# Patient Record
Sex: FEMALE | Race: WHITE
[De-identification: names, ages, dates, MRNs, and addresses within clinical notes are randomized per-mention and may not be internally consistent; named-entity substitution may affect disease eponyms.]

---

## 2019-10-07 ENCOUNTER — HOSPITAL ENCOUNTER (OUTPATIENT)
Dept: HOSPITAL 47 - RADECHMAIN | Age: 24
Discharge: HOME | End: 2019-10-07
Attending: FAMILY MEDICINE
Payer: COMMERCIAL

## 2019-10-07 DIAGNOSIS — R42: ICD-10-CM

## 2019-10-07 DIAGNOSIS — R07.89: ICD-10-CM

## 2019-10-07 DIAGNOSIS — R00.0: Primary | ICD-10-CM

## 2019-10-07 DIAGNOSIS — R00.1: ICD-10-CM

## 2019-10-07 DIAGNOSIS — F41.8: ICD-10-CM

## 2019-10-07 PROCEDURE — 93270 REMOTE 30 DAY ECG REV/REPORT: CPT

## 2019-10-18 NOTE — P.PN
Progress Note - Text


Progress Note Date: 10/18/19





This is a report on the seven-day event monitor starting from October 7.





Baseline rhythm showed sinus with normal WI interval and QRS duration.  Patient 

had episodes of sinus tachycardia with rates up to 180 episodes of sinus 

bradycardia with minimal rates of 57.  Occasional APCs.  Patient complained of 

being dizzy and racing of the heart, shortness of breath, chest pressure on 

several occasions.  These symptoms did not correlate with any significant 

cardiac events.





Final impression: #1.  Baseline rhythm is sinus.  #2.  Episodes of sinus 

tachycardia and sinus bradycardia.  #3.  Several symptoms of dizziness, 

lightheadedness and racing chest tightness, not correlating with any cardiac e

vents.

## 2019-10-21 NOTE — EM
This is a report on the seven-day event monitor starting from October 7.



Baseline rhythm showed sinus with normal OH interval and QRS duration.  Patient 
had episodes of sinus tachycardia with rates up to 180 episodes of sinus 
bradycardia with minimal rates of 57.  Occasional APCs.  Patient complained of 
being dizzy and racing of the heart, shortness of breath, chest pressure on 
several occasions.  These symptoms did not correlate with any significant 
cardiac events.



Final impression: #1.  Baseline rhythm is sinus.  #2.  Episodes of sinus 
tachycardia and sinus bradycardia.  #3.  Several symptoms of dizziness, 
lightheadedness and racing chest tightness, not correlating with any cardiac 
events.

MTDD

## 2019-11-12 ENCOUNTER — HOSPITAL ENCOUNTER (OUTPATIENT)
Dept: HOSPITAL 47 - CATHEP | Age: 24
Discharge: HOME | End: 2019-11-12
Attending: INTERNAL MEDICINE
Payer: COMMERCIAL

## 2019-11-12 VITALS — RESPIRATION RATE: 16 BRPM | TEMPERATURE: 98.8 F

## 2019-11-12 VITALS — SYSTOLIC BLOOD PRESSURE: 106 MMHG | HEART RATE: 65 BPM | DIASTOLIC BLOOD PRESSURE: 72 MMHG

## 2019-11-12 VITALS — BODY MASS INDEX: 21.2 KG/M2

## 2019-11-12 DIAGNOSIS — R55: Primary | ICD-10-CM

## 2019-11-12 PROCEDURE — 93660 TILT TABLE EVALUATION: CPT

## 2019-11-12 NOTE — P.PCN
Preoperative Diagnosis: 


Diagnosis recurrent presyncope





Twelve-lead ECG shows 


sinus rhythm normal IN narrow QRS epsilon waves no ST segment abnormalities 

other than early repolarization abnormality of less than 1 mm no delta waves 

normal QT interval





Tilt table test per protocol


Baseline blood pressure 117/64 mmHg, Baseline heart rate 64 beats a minute


Patient was tilted upright at an angle of 70 per protocol.  Within 12 minutes 

her heart rate increased 222 beats a minute 4 by sudden drop in blood pressure. 

Lowest blood pressure recorded was 60/30 mmHg.  She became pre-syncopal, briefly


This reproduced her clinical symptoms


Upon assuming supine position, heart rate and blood pressure normalized and 

symptoms improved





Impression


Normal twelve-lead ECG


Neurocardiogenic syncope

## 2020-03-17 ENCOUNTER — HOSPITAL ENCOUNTER (OUTPATIENT)
Dept: HOSPITAL 47 - RADECHMAIN | Age: 25
Discharge: HOME | End: 2020-03-17
Attending: FAMILY MEDICINE
Payer: COMMERCIAL

## 2020-03-17 DIAGNOSIS — I95.1: Primary | ICD-10-CM

## 2020-03-17 DIAGNOSIS — I47.1: ICD-10-CM

## 2020-03-17 PROCEDURE — 93270 REMOTE 30 DAY ECG REV/REPORT: CPT

## 2020-04-21 NOTE — EM
EVENT MONITOR



INDICATION:

Palpitations and orthostatic hypotension.



I reviewed the provided event monitor strips.  Patient was primarily in sinus rhythm

with episodes of sinus tachycardia.  The maximum heart rate was around 160 beats per

minute. There were frequent PACs and a 4-beat run of atrial tachycardia noted.  There

were no pauses of more than 2 seconds.



CONCLUSIONS:

This event monitor shows sinus rhythm with sinus tachycardia and paroxysmal atrial

tachycardia.





MMODL / IJN: [id number] / Job#: [id number]

## 2020-07-30 ENCOUNTER — HOSPITAL ENCOUNTER (OUTPATIENT)
Dept: HOSPITAL 47 - LABWHC1 | Age: 25
Discharge: HOME | End: 2020-07-30
Attending: SOCIAL WORKER
Payer: COMMERCIAL

## 2020-07-30 DIAGNOSIS — R76.8: Primary | ICD-10-CM

## 2020-07-30 LAB
ALBUMIN SERPL-MCNC: 4.7 G/DL (ref 3.8–4.9)
ALBUMIN/GLOB SERPL: 1.88 G/DL (ref 1.6–3.17)
ALP SERPL-CCNC: 58 U/L (ref 41–126)
ALT SERPL-CCNC: 28 U/L (ref 8–44)
ANION GAP SERPL CALC-SCNC: 6.6 MMOL/L (ref 4–12)
AST SERPL-CCNC: 23 U/L (ref 13–35)
BASOPHILS # BLD AUTO: 0 K/UL (ref 0–0.2)
BASOPHILS NFR BLD AUTO: 0 %
BUN SERPL-SCNC: 13 MG/DL (ref 9–27)
BUN/CREAT SERPL: 14.44 RATIO (ref 12–20)
CALCIUM SPEC-MCNC: 10 MG/DL (ref 8.7–10.3)
CHLORIDE SERPL-SCNC: 107 MMOL/L (ref 96–109)
CO2 SERPL-SCNC: 24.4 MMOL/L (ref 21.6–31.8)
DSDNA AB SER QL: (no result)
DSDNA AB TITR SER: 5 IU/ML
ENA SCL70 AB SER IA-ACNC: <0.2 AI
ENA SM AB SER IA-ACNC: NEGATIVE
EOSINOPHIL # BLD AUTO: 0 K/UL (ref 0–0.7)
EOSINOPHIL NFR BLD AUTO: 1 %
ERYTHROCYTE [DISTWIDTH] IN BLOOD BY AUTOMATED COUNT: 4.7 M/UL (ref 3.8–5.4)
ERYTHROCYTE [DISTWIDTH] IN BLOOD: 12 % (ref 11.5–15.5)
GLOBULIN SER CALC-MCNC: 2.5 G/DL (ref 1.6–3.3)
GLUCOSE SERPL-MCNC: 82 MG/DL (ref 70–110)
HBV SURFACE AB SERPL IA-ACNC: 3.5 MIU/ML
HCT VFR BLD AUTO: 43 % (ref 34–46)
HGB BLD-MCNC: 13.8 GM/DL (ref 11.4–16)
IRON SERPL-MCNC: 112 UG/DL (ref 50–170)
LYMPHOCYTES # SPEC AUTO: 1.9 K/UL (ref 1–4.8)
LYMPHOCYTES NFR SPEC AUTO: 44 %
MCH RBC QN AUTO: 29.4 PG (ref 25–35)
MCHC RBC AUTO-ENTMCNC: 32.2 G/DL (ref 31–37)
MCV RBC AUTO: 91.4 FL (ref 80–100)
MONOCYTES # BLD AUTO: 0.2 K/UL (ref 0–1)
MONOCYTES NFR BLD AUTO: 4 %
NEUTROPHILS # BLD AUTO: 2.1 K/UL (ref 1.3–7.7)
NEUTROPHILS NFR BLD AUTO: 48 %
PLATELET # BLD AUTO: 237 K/UL (ref 150–450)
POTASSIUM SERPL-SCNC: 4.1 MMOL/L (ref 3.5–5.5)
PROT SERPL-MCNC: 7.2 G/DL (ref 6.2–8.2)
SODIUM SERPL-SCNC: 138 MMOL/L (ref 135–145)
TIBC SERPL-MCNC: 367 UG/DL (ref 228–460)
VIT B12 SERPL-MCNC: 310 PG/ML (ref 200–944)
WBC # BLD AUTO: 4.4 K/UL (ref 3.8–10.6)

## 2020-07-30 PROCEDURE — 86706 HEP B SURFACE ANTIBODY: CPT

## 2020-07-30 PROCEDURE — 36415 COLL VENOUS BLD VENIPUNCTURE: CPT

## 2020-07-30 PROCEDURE — 86235 NUCLEAR ANTIGEN ANTIBODY: CPT

## 2020-07-30 PROCEDURE — 83550 IRON BINDING TEST: CPT

## 2020-07-30 PROCEDURE — 87340 HEPATITIS B SURFACE AG IA: CPT

## 2020-07-30 PROCEDURE — 86225 DNA ANTIBODY NATIVE: CPT

## 2020-07-30 PROCEDURE — 82306 VITAMIN D 25 HYDROXY: CPT

## 2020-07-30 PROCEDURE — 82746 ASSAY OF FOLIC ACID SERUM: CPT

## 2020-07-30 PROCEDURE — 86704 HEP B CORE ANTIBODY TOTAL: CPT

## 2020-07-30 PROCEDURE — 82607 VITAMIN B-12: CPT

## 2020-07-30 PROCEDURE — 83540 ASSAY OF IRON: CPT

## 2020-07-30 PROCEDURE — 86160 COMPLEMENT ANTIGEN: CPT

## 2020-07-30 PROCEDURE — 80053 COMPREHEN METABOLIC PANEL: CPT

## 2020-07-30 PROCEDURE — 86803 HEPATITIS C AB TEST: CPT

## 2020-07-30 PROCEDURE — 85025 COMPLETE CBC W/AUTO DIFF WBC: CPT

## 2020-10-29 ENCOUNTER — HOSPITAL ENCOUNTER (EMERGENCY)
Dept: HOSPITAL 47 - EC | Age: 25
Discharge: HOME | End: 2020-10-29
Payer: COMMERCIAL

## 2020-10-29 VITALS
SYSTOLIC BLOOD PRESSURE: 94 MMHG | RESPIRATION RATE: 16 BRPM | HEART RATE: 84 BPM | TEMPERATURE: 97.7 F | DIASTOLIC BLOOD PRESSURE: 63 MMHG

## 2020-10-29 DIAGNOSIS — Z88.5: ICD-10-CM
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
## 2020-10-29 DIAGNOSIS — Z88.8: ICD-10-CM

## 2020-10-29 DIAGNOSIS — S61.411A: Primary | ICD-10-CM

## 2020-10-29 PROCEDURE — 12002 RPR S/N/AX/GEN/TRNK2.6-7.5CM: CPT

## 2020-10-29 PROCEDURE — 99282 EMERGENCY DEPT VISIT SF MDM: CPT

## 2020-10-29 NOTE — ED
Skin/Abscess/FB HPI





- General


Chief complaint: Skin/Abscess/Foreign Body


Stated complaint: R Hand Lac


Time Seen by Provider: 10/29/20 20:32


Source: patient


Mode of arrival: ambulatory


Limitations: no limitations





- History of Present Illness


Initial comments: 





Patient is a 24-year-old female presenting to the emergency department with a 

chief complaint of laceration to the hand.  Patient states this occurred about 

one hour prior to her level when she was carving a pumpkin and accidentally 

slipped her skin with the knife.  Patient reports the laceration is between the 

first and second digits of the right hand.  Patient reports that she is able to 

still fully move her thumb but does have a small region of numbness on the pad 

of the thumb.  She reports her tetanus status is up-to-date.  Reports there was 

some bleeding initially which has mostly resolved.  States the pain is 

exacerbated with any pressure to the lacerated region.





- Related Data


                                Home Medications











 Medication  Instructions  Recorded  Confirmed


 


Birthcontol Pill 1 tab PO DAILY 02/07/15 02/07/15


 


Escitalopram [Lexapro] 20 mg PO DAILY 11/08/19 11/12/19











                                    Allergies











Allergy/AdvReac Type Severity Reaction Status Date / Time


 


citalopram Allergy  Rash/Hives Verified 10/29/20 20:28


 


codeine AdvReac  Nausea & Verified 10/29/20 20:28





   Vomiting  














Review of Systems


ROS Statement: 


Those systems with pertinent positive or pertinent negative responses have been 

documented in the HPI.





ROS Other: All systems not noted in ROS Statement are negative.





Past Medical History


Additional Past Medical History / Comment(s): see Dr Majano's H&P,multiple 

fractures due to competative cheer


History of Any Multi-Drug Resistant Organisms: None Reported


Past Surgical History: Adenoidectomy, Tonsillectomy


Past Anesthesia/Blood Transfusion Reactions: No Reported Reaction


Additional Past Anesthesia/Blood Transfusion Reaction / Comment(s): no hx blood 

transfusion


Past Psychological History: Anxiety, PTSD


Smoking Status: Never smoker


Past Alcohol Use History: None Reported


Past Drug Use History: Marijuana





- Past Family History


  ** Mother


Family Medical History: No Reported History





General Exam


Limitations: no limitations


General appearance: alert, in no apparent distress


Head exam: Present: atraumatic, normocephalic, normal inspection


Eye exam: Present: normal appearance, PERRL, EOMI.  Absent: scleral icterus, 

conjunctival injection, nystagmus


Pupils: Present: normal accommodation


ENT exam: Present: normal exam, normal oropharynx, mucous membranes moist, TM's 

normal bilaterally, normal external ear exam


Neck exam: Present: normal inspection, full ROM.  Absent: tenderness


Respiratory exam: Present: normal lung sounds bilaterally.  Absent: respiratory 

distress, wheezes, rales


Cardiovascular Exam: Present: regular rate, normal rhythm, normal heart sounds


Extremities exam: Present: full ROM (Full range of motion in the right thumb and

 second digit.), tenderness (Tenderness laceration site.), normal capillary 

refill (Normal capillary refill in all of the fingers of the right hand.), other

 (+2 ulnar and radial pulses bilaterally.).  Absent: normal inspection 

(Laceration in the webbing between first and second digit of her right hand.  

Laceration is about 5 cm in length.  Not able to visualize any muscles in the 

hand.), pedal edema, joint swelling, calf tenderness


Back exam: Present: normal inspection, full ROM.  Absent: tenderness, CVA 

tenderness (R), CVA tenderness (L)


Neurological exam: Present: alert, oriented X3


Psychiatric exam: Present: normal affect, normal mood


Skin exam: Present: warm, dry, intact, normal color





Course


                                   Vital Signs











  10/29/20





  20:25


 


Temperature 97.7 F


 


Pulse Rate 84


 


Respiratory 16





Rate 


 


Blood Pressure 94/63


 


O2 Sat by Pulse 100





Oximetry 














Procedures





- Laceration


  ** Laceration #1


Consent Obtained: verbal consent


Indication: laceration


Site: hand


Size (cm): 5


Description: linear, clean


Depth: simple, single layer


Sedation/Analgesia: none


Anesthetic Used: lidocaine 1%


Anesthesia Technique: local infiltration


Amount (mls): 5


Pre-repair: irrigated extensively, deep structures intact


Type of Sutures: nylon


Size of Sutures: 4-0


Number of Sutures: 7


Technique: simple, interrupted


Patient Tolerated Procedure: well, no complications





Medical Decision Making





- Medical Decision Making





patient is a 24-year-old female presenting to emergency Department with a chief 

complaint of laceration.  On physical examination, patient has a 5 cm laceration

 in the webbing between first and second digit of right hand.  She did have some

 decreased sensation in a small region measuring approximately 1 cm on the 

finger pad of the thumb.  She otherwise has normal capillary refill of the thumb

 and second digit.  She also has full range of motion in those fingers as well. 

 Laceration site was thoroughly irrigated and repaired with 7 sutures.  Patient 

tolerated procedure well.  Return parameters were discussed and she was advised 

to return in 10 days for suture removal.  I also advised to follow-up with 

.  Tetanus was up-to-date.  Case discussed with physician.





Disposition


Clinical Impression: 


 Laceration





Disposition: HOME SELF-CARE


Condition: Stable


Instructions (If sedation given, give patient instructions):  Care For Your 

Stitches (DC), Laceration (DC)


Additional Instructions: 


Follow-up with 


Please return to the emergency room in 8-10 days to have sutures removed.  

Please watch for any signs of infection which may include increased pain, 

swelling, redness, fever or chills.  Please return to emergency room for any 

signs of infection do occur.  Please use clean soap and water over the area to 

prevent scabbing over your stitches.  Please leave wound covered for the first 

24-48 hours and then leave wound open to air.  Please return to the emergency 

room for any other concerns.


Is patient prescribed a controlled substance at d/c from ED?: No


Referrals: 


ADAM Mendez III, MD [Primary Care Provider] - 1-2 days


Serg Pacheco DO [Doctor of Osteopathic Medicine] - 1-2 days


Time of Disposition: 21:09

## 2020-12-10 ENCOUNTER — HOSPITAL ENCOUNTER (EMERGENCY)
Dept: HOSPITAL 47 - EC | Age: 25
Discharge: HOME | End: 2020-12-10
Payer: COMMERCIAL

## 2020-12-10 VITALS — DIASTOLIC BLOOD PRESSURE: 86 MMHG | HEART RATE: 67 BPM | SYSTOLIC BLOOD PRESSURE: 134 MMHG

## 2020-12-10 VITALS — RESPIRATION RATE: 18 BRPM | TEMPERATURE: 98.9 F

## 2020-12-10 DIAGNOSIS — Z88.8: ICD-10-CM

## 2020-12-10 DIAGNOSIS — Z88.5: ICD-10-CM

## 2020-12-10 DIAGNOSIS — F41.9: ICD-10-CM

## 2020-12-10 DIAGNOSIS — N39.0: Primary | ICD-10-CM

## 2020-12-10 DIAGNOSIS — F43.10: ICD-10-CM

## 2020-12-10 DIAGNOSIS — Z79.899: ICD-10-CM

## 2020-12-10 LAB
ALBUMIN SERPL-MCNC: 3.7 G/DL (ref 3.5–5)
ALP SERPL-CCNC: 54 U/L (ref 38–126)
ALT SERPL-CCNC: 15 U/L (ref 4–34)
ANION GAP SERPL CALC-SCNC: 4 MMOL/L
AST SERPL-CCNC: 18 U/L (ref 14–36)
BASOPHILS # BLD AUTO: 0 K/UL (ref 0–0.2)
BASOPHILS NFR BLD AUTO: 1 %
BUN SERPL-SCNC: 15 MG/DL (ref 7–17)
CALCIUM SPEC-MCNC: 9 MG/DL (ref 8.4–10.2)
CHLORIDE SERPL-SCNC: 111 MMOL/L (ref 98–107)
CO2 SERPL-SCNC: 24 MMOL/L (ref 22–30)
EOSINOPHIL # BLD AUTO: 0.1 K/UL (ref 0–0.7)
EOSINOPHIL NFR BLD AUTO: 1 %
ERYTHROCYTE [DISTWIDTH] IN BLOOD BY AUTOMATED COUNT: 3.88 M/UL (ref 3.8–5.4)
ERYTHROCYTE [DISTWIDTH] IN BLOOD: 11.9 % (ref 11.5–15.5)
GLUCOSE SERPL-MCNC: 97 MG/DL (ref 74–99)
HCT VFR BLD AUTO: 35.4 % (ref 34–46)
HGB BLD-MCNC: 12.1 GM/DL (ref 11.4–16)
LYMPHOCYTES # SPEC AUTO: 2.2 K/UL (ref 1–4.8)
LYMPHOCYTES NFR SPEC AUTO: 34 %
MCH RBC QN AUTO: 31.2 PG (ref 25–35)
MCHC RBC AUTO-ENTMCNC: 34.2 G/DL (ref 31–37)
MCV RBC AUTO: 91.1 FL (ref 80–100)
MONOCYTES # BLD AUTO: 0.5 K/UL (ref 0–1)
MONOCYTES NFR BLD AUTO: 7 %
NEUTROPHILS # BLD AUTO: 3.5 K/UL (ref 1.3–7.7)
NEUTROPHILS NFR BLD AUTO: 54 %
PH UR: 6 [PH] (ref 5–8)
PLATELET # BLD AUTO: 189 K/UL (ref 150–450)
POTASSIUM SERPL-SCNC: 3.7 MMOL/L (ref 3.5–5.1)
PROT SERPL-MCNC: 6.5 G/DL (ref 6.3–8.2)
PROT UR QL: (no result)
RBC UR QL: 35 /HPF (ref 0–5)
SODIUM SERPL-SCNC: 139 MMOL/L (ref 137–145)
SP GR UR: 1.02 (ref 1–1.03)
SQUAMOUS UR QL AUTO: 2 /HPF (ref 0–4)
UROBILINOGEN UR QL STRIP: <2 MG/DL (ref ?–2)
WBC # BLD AUTO: 6.4 K/UL (ref 3.8–10.6)
WBC # UR AUTO: 129 /HPF (ref 0–5)

## 2020-12-10 PROCEDURE — 87086 URINE CULTURE/COLONY COUNT: CPT

## 2020-12-10 PROCEDURE — 81025 URINE PREGNANCY TEST: CPT

## 2020-12-10 PROCEDURE — 96374 THER/PROPH/DIAG INJ IV PUSH: CPT

## 2020-12-10 PROCEDURE — 36415 COLL VENOUS BLD VENIPUNCTURE: CPT

## 2020-12-10 PROCEDURE — 96375 TX/PRO/DX INJ NEW DRUG ADDON: CPT

## 2020-12-10 PROCEDURE — 81001 URINALYSIS AUTO W/SCOPE: CPT

## 2020-12-10 PROCEDURE — 74176 CT ABD & PELVIS W/O CONTRAST: CPT

## 2020-12-10 PROCEDURE — 83605 ASSAY OF LACTIC ACID: CPT

## 2020-12-10 PROCEDURE — 80053 COMPREHEN METABOLIC PANEL: CPT

## 2020-12-10 PROCEDURE — 99284 EMERGENCY DEPT VISIT MOD MDM: CPT

## 2020-12-10 PROCEDURE — 85025 COMPLETE CBC W/AUTO DIFF WBC: CPT

## 2020-12-10 NOTE — ED
Female Urogenital HPI





- General


Chief complaint: Urogenital


Stated complaint: Back Pain


Time Seen by Provider: 12/10/20 06:03


Source: patient


Mode of arrival: ambulatory





- History of Present Illness


Initial comments: 


24yo female presenting for low back pain. Patient states that she has had back 

pain and some lower midline tenderness for the past 2-3 days. On first day she 

states she had some right sided pain, that went away--two hours ago the patient 

states that experiences sharp left sided pain. Pt was concerned of kidney stone 

or bladder infection and presented to the ER. Pt denies pregnancy. Denies 

immunocompromise, no chronic steroids/immune modulators. Patient denies fevers, 

chills or general malaise. Pt denies additional complaints. Upon arrival she is 

pleasant and nontoxic in appearance.








- Related Data


                                Home Medications











 Medication  Instructions  Recorded  Confirmed


 


Escitalopram [Lexapro] 20 mg PO DAILY 11/08/19 12/10/20


 


ALPRAZolam [Xanax] 0.25 mg PO DAILY PRN 12/10/20 12/10/20


 


Butalb/APAP/Caff -40Mg 1 tab PO Q4H PRN 12/10/20 12/10/20





[Fioricet -40]   


 


Kariva 1 tab PO DAILY 12/10/20 12/10/20


 


Ondansetron Odt [Zofran Odt] 4 mg PO Q4H PRN 12/10/20 12/10/20








                                  Previous Rx's











 Medication  Instructions  Recorded


 


Cephalexin [Keflex] 500 mg PO Q6HR 5 Days #20 cap 12/10/20











                                    Allergies











Allergy/AdvReac Type Severity Reaction Status Date / Time


 


citalopram Allergy  Rash/Hives Verified 12/10/20 07:01


 


codeine AdvReac  Nausea & Verified 12/10/20 07:01





   Vomiting  














Review of Systems


ROS Statement: 


Those systems with pertinent positive or pertinent negative responses have been 

documented in the HPI.





ROS Other: All systems not noted in ROS Statement are negative.





Past Medical History


Additional Past Medical History / Comment(s): see Dr Majano's H&P,multiple 

fractures due to competative cheer


History of Any Multi-Drug Resistant Organisms: None Reported


Past Surgical History: Adenoidectomy, Tonsillectomy


Past Anesthesia/Blood Transfusion Reactions: No Reported Reaction


Additional Past Anesthesia/Blood Transfusion Reaction / Comment(s): no hx blood 

transfusion


Past Psychological History: Anxiety, PTSD


Smoking Status: Never smoker


Past Alcohol Use History: None Reported


Past Drug Use History: Marijuana





- Past Family History


  ** Mother


Family Medical History: No Reported History





General Exam





- General Exam Comments


Initial Comments: 


General:  The patient is awake and alert, in no distress


Eye:  +3 mm pupils are equal, round and reactive to light, extra-ocular 

movements are intact.  No nystagmus.  There is normal conjunctiva bilaterally.  

No signs of icterus.  


Ears, nose, mouth and throat:  There are moist mucous membranes and no oral 

lesions. 


Gastrointestinal:  Soft, non-distended, non-tender abdomen without masses or 

organomegaly noted. There is no rebound or guarding present.  


Musculoskeletal:  Normal ROM, no tenderness.  Strength 5/5. Sensation intact. 

Radial pulses equal bilaterally 2+.  


Neurological:  A&O x 3. CN II-XII intact grossly, There are no obvious motor or 

sensory deficits. Coordination appears grossly intact. Speech is normal.


Skin:  Skin is warm and dry and no rashes or lesions are noted. 


Psychiatric:  Cooperative, appropriate mood & affect, normal judgment.  








Course


                                   Vital Signs











  12/10/20 12/10/20





  05:34 07:54


 


Temperature 98.9 F 


 


Pulse Rate 72 67


 


Respiratory 18 18





Rate  


 


Blood Pressure 140/91 134/86


 


O2 Sat by Pulse 99 100





Oximetry  














Medical Decision Making





- Medical Decision Making


labs stable. afebrile. suprapubic tenderness. low back pain b/l. changes sides. 

CT (-) stone. urine consistent with infection. culture pending. pt given 

rocephin. initiated on oral antibiotics. is to f/u with pcp adn return for 

worsenign symptoms. dr. olivo agreeable to care plan and discharge.








- Lab Data


Result diagrams: 


                                 12/10/20 07:30





                                 12/10/20 07:30


                                   Lab Results











  12/10/20 12/10/20 12/10/20 Range/Units





  05:39 05:41 07:30 


 


WBC    6.4  (3.8-10.6)  k/uL


 


RBC    3.88  (3.80-5.40)  m/uL


 


Hgb    12.1  (11.4-16.0)  gm/dL


 


Hct    35.4  (34.0-46.0)  %


 


MCV    91.1  (80.0-100.0)  fL


 


MCH    31.2  (25.0-35.0)  pg


 


MCHC    34.2  (31.0-37.0)  g/dL


 


RDW    11.9  (11.5-15.5)  %


 


Plt Count    189  (150-450)  k/uL


 


MPV    8.0  


 


Neutrophils %    54  %


 


Lymphocytes %    34  %


 


Monocytes %    7  %


 


Eosinophils %    1  %


 


Basophils %    1  %


 


Neutrophils #    3.5  (1.3-7.7)  k/uL


 


Lymphocytes #    2.2  (1.0-4.8)  k/uL


 


Monocytes #    0.5  (0-1.0)  k/uL


 


Eosinophils #    0.1  (0-0.7)  k/uL


 


Basophils #    0.0  (0-0.2)  k/uL


 


Sodium     (137-145)  mmol/L


 


Potassium     (3.5-5.1)  mmol/L


 


Chloride     ()  mmol/L


 


Carbon Dioxide     (22-30)  mmol/L


 


Anion Gap     mmol/L


 


BUN     (7-17)  mg/dL


 


Creatinine     (0.52-1.04)  mg/dL


 


Est GFR (CKD-EPI)AfAm     (>60 ml/min/1.73 sqM)  


 


Est GFR (CKD-EPI)NonAf     (>60 ml/min/1.73 sqM)  


 


Glucose     (74-99)  mg/dL


 


Plasma Lactic Acid Asif     (0.7-2.0)  mmol/L


 


Calcium     (8.4-10.2)  mg/dL


 


Total Bilirubin     (0.2-1.3)  mg/dL


 


AST     (14-36)  U/L


 


ALT     (4-34)  U/L


 


Alkaline Phosphatase     ()  U/L


 


Total Protein     (6.3-8.2)  g/dL


 


Albumin     (3.5-5.0)  g/dL


 


Urine Color  Yellow    


 


Urine Appearance  Cloudy H    (Clear)  


 


Urine pH  6.0    (5.0-8.0)  


 


Ur Specific Gravity  1.020    (1.001-1.035)  


 


Urine Protein  1+ H    (Negative)  


 


Urine Glucose (UA)  Negative    (Negative)  


 


Urine Ketones  Negative    (Negative)  


 


Urine Blood  Small H    (Negative)  


 


Urine Nitrite  Negative    (Negative)  


 


Urine Bilirubin  Negative    (Negative)  


 


Urine Urobilinogen  <2.0    (<2.0)  mg/dL


 


Ur Leukocyte Esterase  Large H    (Negative)  


 


Urine RBC  35 H    (0-5)  /hpf


 


Urine WBC  129 H    (0-5)  /hpf


 


Ur Squamous Epith Cells  2    (0-4)  /hpf


 


Urine Bacteria  Rare H    (None)  /hpf


 


Urine Mucus  Many H    (None)  /hpf


 


Urine Yeast (Budding)  Occasional H    (None)  /hpf


 


Urine HCG, Qual   Not Detected   (Not Detectd)  














  12/10/20 12/10/20 Range/Units





  07:30 07:30 


 


WBC    (3.8-10.6)  k/uL


 


RBC    (3.80-5.40)  m/uL


 


Hgb    (11.4-16.0)  gm/dL


 


Hct    (34.0-46.0)  %


 


MCV    (80.0-100.0)  fL


 


MCH    (25.0-35.0)  pg


 


MCHC    (31.0-37.0)  g/dL


 


RDW    (11.5-15.5)  %


 


Plt Count    (150-450)  k/uL


 


MPV    


 


Neutrophils %    %


 


Lymphocytes %    %


 


Monocytes %    %


 


Eosinophils %    %


 


Basophils %    %


 


Neutrophils #    (1.3-7.7)  k/uL


 


Lymphocytes #    (1.0-4.8)  k/uL


 


Monocytes #    (0-1.0)  k/uL


 


Eosinophils #    (0-0.7)  k/uL


 


Basophils #    (0-0.2)  k/uL


 


Sodium  139   (137-145)  mmol/L


 


Potassium  3.7   (3.5-5.1)  mmol/L


 


Chloride  111 H   ()  mmol/L


 


Carbon Dioxide  24   (22-30)  mmol/L


 


Anion Gap  4   mmol/L


 


BUN  15   (7-17)  mg/dL


 


Creatinine  0.81   (0.52-1.04)  mg/dL


 


Est GFR (CKD-EPI)AfAm  >90   (>60 ml/min/1.73 sqM)  


 


Est GFR (CKD-EPI)NonAf  >90   (>60 ml/min/1.73 sqM)  


 


Glucose  97   (74-99)  mg/dL


 


Plasma Lactic Acid Asif   0.6 L  (0.7-2.0)  mmol/L


 


Calcium  9.0   (8.4-10.2)  mg/dL


 


Total Bilirubin  0.3   (0.2-1.3)  mg/dL


 


AST  18   (14-36)  U/L


 


ALT  15   (4-34)  U/L


 


Alkaline Phosphatase  54   ()  U/L


 


Total Protein  6.5   (6.3-8.2)  g/dL


 


Albumin  3.7   (3.5-5.0)  g/dL


 


Urine Color    


 


Urine Appearance    (Clear)  


 


Urine pH    (5.0-8.0)  


 


Ur Specific Gravity    (1.001-1.035)  


 


Urine Protein    (Negative)  


 


Urine Glucose (UA)    (Negative)  


 


Urine Ketones    (Negative)  


 


Urine Blood    (Negative)  


 


Urine Nitrite    (Negative)  


 


Urine Bilirubin    (Negative)  


 


Urine Urobilinogen    (<2.0)  mg/dL


 


Ur Leukocyte Esterase    (Negative)  


 


Urine RBC    (0-5)  /hpf


 


Urine WBC    (0-5)  /hpf


 


Ur Squamous Epith Cells    (0-4)  /hpf


 


Urine Bacteria    (None)  /hpf


 


Urine Mucus    (None)  /hpf


 


Urine Yeast (Budding)    (None)  /hpf


 


Urine HCG, Qual    (Not Detectd)  














Disposition


Clinical Impression: 


 UTI (urinary tract infection)





Disposition: HOME SELF-CARE


Condition: Good


Instructions (If sedation given, give patient instructions):  Urinary Tract 

Infection in Women (ED)


Additional Instructions: 


Please use medication as discussed.  Please follow-up with family doctor in the 

next 2 days. Please return to emergency room if the symptoms increase or worsen 

or for any other concerns.


Prescriptions: 


Cephalexin [Keflex] 500 mg PO Q6HR 5 Days #20 cap


Is patient prescribed a controlled substance at d/c from ED?: No


Referrals: 


ADAM Mendez III, MD [Primary Care Provider] - 1-2 days


Time of Disposition: 07:52

## 2020-12-10 NOTE — CT
EXAMINATION TYPE: CT abdomen pelvis wo con

 

DATE OF EXAM: 12/10/2020

 

HISTORY: Right sided flank pain

 

CT DLP: 371.8 mGycm.  Automated Exposure Control for Dose Reduction was Utilized.

 

TECHNIQUE:  CT scan of the abdomen and pelvis is performed without oral or IV contrast.

 

COMPARISON: NONE

 

FINDINGS:  Within the limitations of a non-contrast study, the following observations are made.

 

LUNG BASES: No significant abnormality is appreciated.

 

LIVER/GB: Gallbladder is contracted in appearance.

 

PANCREAS: No significant abnormality is seen.

 

SPLEEN: No significant abnormality is seen.

 

ADRENALS: No significant abnormality is seen.

 

KIDNEYS: No renal stones or hydronephrosis is present bilaterally.

 

BOWEL: Suboptimal evaluation without enteric contrast and patient having little intra-abdominal fat. 
No suspicious small or large bowel dilatation is appreciated. Slightly low-lying cecum with normal ap
pearing appendix medially from it noted.

 

GENITAL ORGANS: Anteverted uterus. Occasional scattered pelvic phleboliths.

 

LYMPH NODES: No greater than 1cm abdominal or pelvic lymph nodes are appreciated.

 

OSSEOUS STRUCTURES: Insertional-type vertebra at lumbosacral junction. Slight dextroconvex scoliotic 
curvature on coronal images.

 

OTHER: No significant additional abnormality is seen.

 

IMPRESSION: No renal stones or hydronephrosis is seen bilaterally. No acute findings identified on no
ncontrast CT

## 2021-06-12 ENCOUNTER — HOSPITAL ENCOUNTER (EMERGENCY)
Dept: HOSPITAL 47 - EC | Age: 26
Discharge: HOME | End: 2021-06-12
Payer: COMMERCIAL

## 2021-06-12 VITALS
DIASTOLIC BLOOD PRESSURE: 88 MMHG | TEMPERATURE: 97.9 F | SYSTOLIC BLOOD PRESSURE: 126 MMHG | HEART RATE: 71 BPM | RESPIRATION RATE: 16 BRPM

## 2021-06-12 DIAGNOSIS — Z02.83: Primary | ICD-10-CM
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
## 2021-06-12 PROCEDURE — 99283 EMERGENCY DEPT VISIT LOW MDM: CPT
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
## 2023-01-20 ENCOUNTER — HOSPITAL ENCOUNTER (EMERGENCY)
Dept: HOSPITAL 47 - EC | Age: 28
Discharge: HOME | End: 2023-01-20
Payer: COMMERCIAL

## 2023-01-20 VITALS — DIASTOLIC BLOOD PRESSURE: 84 MMHG | SYSTOLIC BLOOD PRESSURE: 122 MMHG | HEART RATE: 99 BPM | RESPIRATION RATE: 18 BRPM

## 2023-01-20 VITALS — TEMPERATURE: 98.2 F

## 2023-01-20 DIAGNOSIS — F41.9: ICD-10-CM

## 2023-01-20 DIAGNOSIS — Z88.1: ICD-10-CM

## 2023-01-20 DIAGNOSIS — F12.90: ICD-10-CM

## 2023-01-20 DIAGNOSIS — R11.2: Primary | ICD-10-CM

## 2023-01-20 DIAGNOSIS — Z88.2: ICD-10-CM

## 2023-01-20 DIAGNOSIS — Z88.5: ICD-10-CM

## 2023-01-20 LAB
ALBUMIN SERPL-MCNC: 5.4 G/DL (ref 3.5–5)
ALP SERPL-CCNC: 67 U/L (ref 38–126)
ALT SERPL-CCNC: 29 U/L (ref 4–34)
AMYLASE SERPL-CCNC: 80 U/L (ref 30–110)
ANION GAP SERPL CALC-SCNC: 14 MMOL/L
AST SERPL-CCNC: 28 U/L (ref 14–36)
BASOPHILS # BLD AUTO: 0 K/UL (ref 0–0.2)
BASOPHILS NFR BLD AUTO: 0 %
BUN SERPL-SCNC: 18 MG/DL (ref 7–17)
CALCIUM SPEC-MCNC: 10.3 MG/DL (ref 8.4–10.2)
CHLORIDE SERPL-SCNC: 108 MMOL/L (ref 98–107)
CO2 SERPL-SCNC: 22 MMOL/L (ref 22–30)
EOSINOPHIL # BLD AUTO: 0.1 K/UL (ref 0–0.7)
EOSINOPHIL NFR BLD AUTO: 1 %
ERYTHROCYTE [DISTWIDTH] IN BLOOD BY AUTOMATED COUNT: 5.05 M/UL (ref 3.8–5.4)
ERYTHROCYTE [DISTWIDTH] IN BLOOD: 12.4 % (ref 11.5–15.5)
GLUCOSE SERPL-MCNC: 148 MG/DL (ref 74–99)
HCG SERPL-MCNC: <2.4 MIU/ML
HCT VFR BLD AUTO: 45.6 % (ref 34–46)
HGB BLD-MCNC: 15 GM/DL (ref 11.4–16)
LIPASE SERPL-CCNC: 82 U/L (ref 23–300)
LYMPHOCYTES # SPEC AUTO: 1.7 K/UL (ref 1–4.8)
LYMPHOCYTES NFR SPEC AUTO: 12 %
MCH RBC QN AUTO: 29.7 PG (ref 25–35)
MCHC RBC AUTO-ENTMCNC: 33 G/DL (ref 31–37)
MCV RBC AUTO: 90.1 FL (ref 80–100)
MONOCYTES # BLD AUTO: 0.5 K/UL (ref 0–1)
MONOCYTES NFR BLD AUTO: 4 %
NEUTROPHILS # BLD AUTO: 11.4 K/UL (ref 1.3–7.7)
NEUTROPHILS NFR BLD AUTO: 82 %
PLATELET # BLD AUTO: 281 K/UL (ref 150–450)
POTASSIUM SERPL-SCNC: 4.1 MMOL/L (ref 3.5–5.1)
PROT SERPL-MCNC: 8.9 G/DL (ref 6.3–8.2)
SODIUM SERPL-SCNC: 144 MMOL/L (ref 137–145)
WBC # BLD AUTO: 13.9 K/UL (ref 3.8–10.6)

## 2023-01-20 PROCEDURE — 99284 EMERGENCY DEPT VISIT MOD MDM: CPT

## 2023-01-20 PROCEDURE — 96361 HYDRATE IV INFUSION ADD-ON: CPT

## 2023-01-20 PROCEDURE — 82150 ASSAY OF AMYLASE: CPT

## 2023-01-20 PROCEDURE — 36415 COLL VENOUS BLD VENIPUNCTURE: CPT

## 2023-01-20 PROCEDURE — 85025 COMPLETE CBC W/AUTO DIFF WBC: CPT

## 2023-01-20 PROCEDURE — 96375 TX/PRO/DX INJ NEW DRUG ADDON: CPT

## 2023-01-20 PROCEDURE — 96374 THER/PROPH/DIAG INJ IV PUSH: CPT

## 2023-01-20 PROCEDURE — 83690 ASSAY OF LIPASE: CPT

## 2023-01-20 PROCEDURE — 80053 COMPREHEN METABOLIC PANEL: CPT

## 2023-01-20 PROCEDURE — 84702 CHORIONIC GONADOTROPIN TEST: CPT

## 2023-01-20 NOTE — ED
General Adult HPI





- General


Chief complaint: Nausea/Vomiting/Diarrhea


Stated complaint: Nausea, vomiting


Time Seen by Provider: 01/20/23 09:23


Source: patient, family, RN notes reviewed


Mode of arrival: ambulatory


Limitations: no limitations





- History of Present Illness


Initial comments: 





Patient is a pleasant 27-year-old female presenting to the emergency department 

with concerns for nausea vomiting.  Onset of symptoms was just 2 hours ago.  

Patient has vomited several times and still feels nauseated.  Patient is 

vomiting now.  Patient denies possible pregnancy.  Patient has had some minimal 

loose stools.  No abdominal pain.  No fever.  No history of chronic similar 

symptoms previously.  Patient did try oral Zofran without improvement.





- Related Data


                                Home Medications











 Medication  Instructions  Recorded  Confirmed


 


Escitalopram [Lexapro] 20 mg PO DAILY 11/08/19 12/10/20


 


ALPRAZolam [Xanax] 0.25 mg PO DAILY PRN 12/10/20 12/10/20


 


Butalb/APAP/Caff -40Mg 1 tab PO Q4H PRN 12/10/20 12/10/20





[Fioricet -40]   


 


Kariva 1 tab PO DAILY 12/10/20 12/10/20


 


Ondansetron Odt [Zofran Odt] 4 mg PO Q4H PRN 12/10/20 12/10/20








                                  Previous Rx's











 Medication  Instructions  Recorded


 


cephALEXin [Keflex] 500 mg PO Q6HR 5 Days #20 cap 12/10/20


 


Metoclopramide HCl [Reglan] 10 mg PO Q6HR PRN #15 tablet 01/20/23











                                    Allergies











Allergy/AdvReac Type Severity Reaction Status Date / Time


 


citalopram Allergy  Rash/Hives Verified 06/12/21 00:10


 


sulfamethoxazole Allergy  Nausea & Verified 01/20/23 09:21





[From Bactrim]   Vomiting  


 


trimethoprim [From Bactrim] Allergy  Nausea & Verified 01/20/23 09:21





   Vomiting  


 


codeine AdvReac  Nausea & Verified 06/12/21 00:10





   Vomiting  














Review of Systems


ROS Statement: 


Those systems with pertinent positive or pertinent negative responses have been 

documented in the HPI.





ROS Other: All systems not noted in ROS Statement are negative.


Constitutional: Denies: fever


Eyes: Denies: eye pain


ENT: Denies: ear pain


Respiratory: Denies: cough


Cardiovascular: Denies: chest pain


Endocrine: Denies: fatigue


Gastrointestinal: Reports: as per HPI, nausea, vomiting, diarrhea.  Denies: 

abdominal pain, constipation


Genitourinary: Denies: dysuria


Musculoskeletal: Denies: back pain


Skin: Denies: lesions


Neurological: Denies: weakness





Past Medical History


Additional Past Medical History / Comment(s): see Dr Majano's H&P,multiple fra

ctures due to competative cheer


History of Any Multi-Drug Resistant Organisms: None Reported


Past Surgical History: Adenoidectomy, Tonsillectomy


Past Anesthesia/Blood Transfusion Reactions: No Reported Reaction


Additional Past Anesthesia/Blood Transfusion Reaction / Comment(s): no hx blood 

transfusion


Past Psychological History: Anxiety, PTSD


Smoking Status: Never smoker


Past Alcohol Use History: Occasional


Past Drug Use History: Marijuana





- Past Family History


  ** Mother


Family Medical History: No Reported History





General Exam


Limitations: no limitations


General appearance: alert, in no apparent distress


Head exam: Present: normocephalic


Eye exam: Present: normal appearance


Neck exam: Present: normal inspection


Respiratory exam: Present: normal lung sounds bilaterally


Cardiovascular Exam: Present: regular rate, normal rhythm


GI/Abdominal exam: Present: soft.  Absent: distended, tenderness, guarding, 

rebound, rigid, pulsatile mass


Extremities exam: Present: normal inspection


Neurological exam: Present: alert


Psychiatric exam: Present: normal affect, normal mood


Skin exam: Present: normal color





Course


                                   Vital Signs











  01/20/23 01/20/23 01/20/23





  09:18 10:33 11:09


 


Temperature 98.2 F  


 


Pulse Rate 94 98 98


 


Respiratory 22 18 20





Rate   


 


Blood Pressure 115/74  


 


O2 Sat by Pulse 96 99 99





Oximetry   














Medical Decision Making





- Medical Decision Making





Was pt. sent in by a medical professional or institution (, PA, NP, urgent 

care, hospital, or nursing home...) When possible be specific


@  -No


Did you speak to anyone other than the patient for history (EMS, parent, family,

police, friend...)? What history was obtained from this source 


@  -Family is present and helps provide history regarding onset of symptoms


Did you review nursing and triage notes (agree or disagree)?  Why? 


@  -I reviewed and agree with nursing and triage notes


Were old charts reviewed (outside hosp., previous admission, EMS record, old 

EKG, old radiological studies, urgent care reports/EKG's, nursing home records)?

Report findings 


@  -No old charts were reviewed


Differential Diagnosis (chest pain, altered mental status, abdominal pain women,

abdominal pain men, vaginal bleeding, weakness, fever, dyspnea, syncope, 

headache, dizziness, GI bleed, back pain, seizure, CVA, palpatations, mental 

health)? 


@  -Differential Abdominal Pain Women:


Appendicitis, Cholecystitis, diverticulosis, ischemic bowel, pancreatitis, 

hepatitis, UTI, gastroenteritis, AAA, incarcerated hernia, bowel obstruction, 

constipation, inflammatory bowel, hepatitis, peptic ulcer disease, splenic 

infarction, perforated viscus, vulvitis, ovarian torsion, PID, kidney stone, 

placenta abruption, this is not meant to be an all-inclusive list


EKG interpreted by me (3pts min.).


@  -


X-rays interpreted by me (1pt min.).


@  -None done


CT interpreted by me (1pt min.).


@  -None done


U/S interpreted by me (1pt. min.).


@  -None done


What testing was considered but not performed or refused? (CT, X-rays, U/S, 

labs)? Why?


@  -None


What meds were considered but not given or refused? Why?


@  -Considered further nausea medication however patient feels better


Did you discuss the management of the patient with other professionals (pr

ofessionals i.e. , PA, NP, lab, RT, psych nurse, , , 

teacher, , )? Give summary


@  -No


Was smoking cessation discussed for >3mins.?


@  -No


Was critical care preformed (if so, how long)?


@  -No


Were there social determinants of health that impacted care today? How? 

(Homelessness, low income, unemployed, alcoholism, drug addiction, transport

ation, low edu. Level, literacy, decrease access to med. care, [facility], rehab)?


@  -No


Was there de-escalation of care discussed even if they declined (Discuss DNR or 

withdrawal of care, Hospice)? DNR status


@  -No


What co-morbidities impacted this encounter? (DM, HTN, Smoking, COPD, CAD, 

Cancer, CVA, ARF, Chemo, Hep., AIDS, mental health diagnosis, sleep apnea, 

morbid obesity)?


@  -None


Was patient admitted / discharged? Hospital course, mention meds given and 

route, prescriptions, significant lab abnormalities, going to OR and other 

pertinent info.


@  -Patient reevaluated twice.  First time was still nauseated.  Second time is 

feeling much better and is comfortable with discharge home.  Patient and family 

updated on results and need for follow-up as well as prescription being sent to 

pharmacy 


Undiagnosed new problem with uncertain prognosis?


@  -


Drug Therapy requiring intensive monitoring for toxicity (Heparin, Nitro, 

Insulin, Cardizem)?


@  -No


Were any procedures done?


@  -No


Diagnosis/symptom?


@  -Acute vomiting


Acute, or Chronic, or Acute on Chronic?


@  -Acute


Uncomplicated (without systemic symptoms) or Complicated (systemic symptoms)?


@  -Uncomplicated


Side effects of treatment?


@  -No


Exacerbation, Progression, or Severe Exacerbation?


@  -No


Poses a threat to life or bodily function? How? (Chest pain, USA, MI, pneumonia,

PE, COPD, DKA, ARF, appy, cholecystitis, CVA, Diverticulitis, Homicidal, 

Suicidal, threat to staff... and all critical care pts)


@  -No





- Lab Data


Result diagrams: 


                                 01/20/23 09:36





                                 01/20/23 09:36


                                   Lab Results











  01/20/23 01/20/23 Range/Units





  09:36 09:36 


 


WBC  13.9 H   (3.8-10.6)  k/uL


 


RBC  5.05   (3.80-5.40)  m/uL


 


Hgb  15.0   (11.4-16.0)  gm/dL


 


Hct  45.6   (34.0-46.0)  %


 


MCV  90.1   (80.0-100.0)  fL


 


MCH  29.7   (25.0-35.0)  pg


 


MCHC  33.0   (31.0-37.0)  g/dL


 


RDW  12.4   (11.5-15.5)  %


 


Plt Count  281   (150-450)  k/uL


 


MPV  7.9   


 


Neutrophils %  82   %


 


Lymphocytes %  12   %


 


Monocytes %  4   %


 


Eosinophils %  1   %


 


Basophils %  0   %


 


Neutrophils #  11.4 H   (1.3-7.7)  k/uL


 


Lymphocytes #  1.7   (1.0-4.8)  k/uL


 


Monocytes #  0.5   (0-1.0)  k/uL


 


Eosinophils #  0.1   (0-0.7)  k/uL


 


Basophils #  0.0   (0-0.2)  k/uL


 


Sodium   144  (137-145)  mmol/L


 


Potassium   4.1  (3.5-5.1)  mmol/L


 


Chloride   108 H  ()  mmol/L


 


Carbon Dioxide   22  (22-30)  mmol/L


 


Anion Gap   14  mmol/L


 


BUN   18 H  (7-17)  mg/dL


 


Creatinine   0.93  (0.52-1.04)  mg/dL


 


Est GFR (CKD-EPI)AfAm   >90  (>60 ml/min/1.73 sqM)  


 


Est GFR (CKD-EPI)NonAf   86  (>60 ml/min/1.73 sqM)  


 


Glucose   148 H  (74-99)  mg/dL


 


Calcium   10.3 H  (8.4-10.2)  mg/dL


 


Total Bilirubin   0.7  (0.2-1.3)  mg/dL


 


AST   28  (14-36)  U/L


 


ALT   29  (4-34)  U/L


 


Alkaline Phosphatase   67  ()  U/L


 


Total Protein   8.9 H  (6.3-8.2)  g/dL


 


Albumin   5.4 H  (3.5-5.0)  g/dL


 


Amylase   80  ()  U/L


 


Lipase   82  ()  U/L


 


HCG, Quant   <2.4  mIU/mL














Disposition


Clinical Impression: 


 Vomiting





Disposition: HOME SELF-CARE


Condition: Stable


Instructions (If sedation given, give patient instructions):  Acute Nausea and 

Vomiting (ED)


Additional Instructions: 


Please do follow-up with primary care physician in the next couple days for r

echeck.  You will need to have your labs rechecked in the next week or 2.  

Return for pain, fever, uncontrolled vomiting, worsening or changing symptoms or

other concerns.  Prescription and sent to pharmacy.


Prescriptions: 


Metoclopramide HCl [Reglan] 10 mg PO Q6HR PRN #15 tablet


 PRN Reason: Nausea


Is patient prescribed a controlled substance at d/c from ED?: No


Referrals: 


ADAM Mendez III, MD [Primary Care Provider] - 1-2 days


Time of Disposition: 11:27